# Patient Record
Sex: MALE | Race: WHITE | NOT HISPANIC OR LATINO | Employment: OTHER | URBAN - METROPOLITAN AREA
[De-identification: names, ages, dates, MRNs, and addresses within clinical notes are randomized per-mention and may not be internally consistent; named-entity substitution may affect disease eponyms.]

---

## 2023-12-16 ENCOUNTER — APPOINTMENT (OUTPATIENT)
Dept: RADIOLOGY | Facility: IMAGING CENTER | Age: 78
End: 2023-12-16
Payer: MEDICARE

## 2023-12-16 ENCOUNTER — OFFICE VISIT (OUTPATIENT)
Dept: URGENT CARE | Facility: CLINIC | Age: 78
End: 2023-12-16
Payer: MEDICARE

## 2023-12-16 VITALS
OXYGEN SATURATION: 92 % | HEART RATE: 94 BPM | WEIGHT: 225 LBS | SYSTOLIC BLOOD PRESSURE: 146 MMHG | RESPIRATION RATE: 16 BRPM | HEIGHT: 75 IN | DIASTOLIC BLOOD PRESSURE: 62 MMHG | BODY MASS INDEX: 27.98 KG/M2 | TEMPERATURE: 99.7 F

## 2023-12-16 DIAGNOSIS — R05.8 PRODUCTIVE COUGH: ICD-10-CM

## 2023-12-16 DIAGNOSIS — R03.0 ELEVATED BLOOD PRESSURE READING: ICD-10-CM

## 2023-12-16 DIAGNOSIS — J06.9 UPPER RESPIRATORY TRACT INFECTION, UNSPECIFIED TYPE: ICD-10-CM

## 2023-12-16 PROBLEM — M67.919 DISORDER OF ROTATOR CUFF: Status: ACTIVE | Noted: 2020-09-16

## 2023-12-16 PROBLEM — J34.2 DEVIATED NASAL SEPTUM: Status: ACTIVE | Noted: 2023-07-26

## 2023-12-16 PROBLEM — M25.551 PAIN IN RIGHT HIP: Status: ACTIVE | Noted: 2023-12-16

## 2023-12-16 PROBLEM — M54.50 ACUTE LOW BACK PAIN: Status: ACTIVE | Noted: 2020-09-16

## 2023-12-16 PROBLEM — S47.9XXA: Status: ACTIVE | Noted: 2023-03-08

## 2023-12-16 PROBLEM — M89.49 OSTEOARTHROSIS INVOLVING MULTIPLE SITES BUT NOT DESIGNATED AS GENERALIZED: Status: ACTIVE | Noted: 2023-12-16

## 2023-12-16 PROBLEM — Z96.642 S/P TOTAL LEFT HIP ARTHROPLASTY: Status: ACTIVE | Noted: 2023-08-16

## 2023-12-16 PROBLEM — D32.9 BENIGN NEOPLASM OF MENINGES (HCC): Status: ACTIVE | Noted: 2023-12-16

## 2023-12-16 PROBLEM — M48.00 SPINAL STENOSIS: Status: ACTIVE | Noted: 2020-09-18

## 2023-12-16 PROBLEM — B54 MALARIA: Status: ACTIVE | Noted: 2023-12-16

## 2023-12-16 PROBLEM — M16.9 OSTEOARTHRITIS OF HIP: Status: ACTIVE | Noted: 2020-09-18

## 2023-12-16 LAB
FLUAV RNA SPEC QL NAA+PROBE: NEGATIVE
FLUBV RNA SPEC QL NAA+PROBE: NEGATIVE
RSV RNA SPEC QL NAA+PROBE: NEGATIVE
SARS-COV-2 RNA RESP QL NAA+PROBE: NEGATIVE

## 2023-12-16 PROCEDURE — 3077F SYST BP >= 140 MM HG: CPT

## 2023-12-16 PROCEDURE — 0241U POCT CEPHEID COV-2, FLU A/B, RSV - PCR: CPT

## 2023-12-16 PROCEDURE — 3078F DIAST BP <80 MM HG: CPT

## 2023-12-16 PROCEDURE — 71046 X-RAY EXAM CHEST 2 VIEWS: CPT | Mod: TC

## 2023-12-16 PROCEDURE — 99203 OFFICE O/P NEW LOW 30 MIN: CPT

## 2023-12-16 RX ORDER — ACETAMINOPHEN 500 MG
1000 TABLET ORAL
COMMUNITY
Start: 2023-07-28

## 2023-12-16 RX ORDER — ASPIRIN 81 MG/1
81 TABLET ORAL
COMMUNITY
Start: 2023-08-16 | End: 2023-12-16

## 2023-12-16 RX ORDER — ASPIRIN 81 MG/1
81 TABLET, CHEWABLE ORAL
COMMUNITY
Start: 2023-07-28

## 2023-12-16 RX ORDER — CELECOXIB 200 MG/1
200 CAPSULE ORAL
COMMUNITY
Start: 2023-07-28

## 2023-12-16 RX ORDER — VITAMIN B COMPLEX
1000 TABLET ORAL DAILY
COMMUNITY

## 2023-12-16 RX ORDER — METHYLPREDNISOLONE 4 MG/1
TABLET ORAL
Qty: 21 TABLET | Refills: 0 | Status: SHIPPED | OUTPATIENT
Start: 2023-12-16

## 2023-12-16 RX ORDER — ROSUVASTATIN CALCIUM 5 MG/1
5 TABLET, COATED ORAL
COMMUNITY
Start: 2023-09-15 | End: 2024-09-14

## 2023-12-16 RX ORDER — AMOXICILLIN AND CLAVULANATE POTASSIUM 875; 125 MG/1; MG/1
1 TABLET, FILM COATED ORAL 2 TIMES DAILY
Qty: 14 TABLET | Refills: 0 | Status: SHIPPED | OUTPATIENT
Start: 2023-12-16 | End: 2023-12-23

## 2023-12-16 RX ORDER — ALBUTEROL SULFATE 90 UG/1
2 AEROSOL, METERED RESPIRATORY (INHALATION) EVERY 6 HOURS PRN
Qty: 8.5 G | Refills: 0 | Status: SHIPPED | OUTPATIENT
Start: 2023-12-16

## 2023-12-16 RX ORDER — POLYETHYLENE GLYCOL 3350, SODIUM SULFATE ANHYDROUS, SODIUM BICARBONATE, SODIUM CHLORIDE, POTASSIUM CHLORIDE 236; 22.74; 6.74; 5.86; 2.97 G/4L; G/4L; G/4L; G/4L; G/4L
POWDER, FOR SOLUTION ORAL
COMMUNITY
Start: 2023-09-15

## 2023-12-16 RX ORDER — RAMIPRIL 10 MG/1
10 CAPSULE ORAL DAILY
COMMUNITY
Start: 2023-09-15 | End: 2024-09-14

## 2023-12-16 RX ORDER — IBUPROFEN 200 MG
200 TABLET ORAL
COMMUNITY
Start: 2023-07-28

## 2023-12-16 NOTE — PROGRESS NOTES
Subjective:   Doe Ramos is a 78 y.o. male who presents for Cough (Runny nose, sneezing, productive cough with dark / bloody phlegm, body aches x  4 days)      HPI:    Patient presents to urgent care with concerns of rhinorrhea, nasal congestion, headache, sore throat, cough.  States he is also sneezing.   He is accompanied by his wife  Onset was 4 days ago  Reports wheezing, chest tightness, SOB  Denies pmh of asthma, copd, smoking  States his cough is productive of dark brown/bloody phlegm  Endorses low grade fever, not measured at home.  Experiencing body aches and chills.  Denies known sick contacts  Mild improvement with Tylenol, Mucinex  Vaccinations are up to date  Tolerating solids and fluids, reports decreased appetite  Normal urinary output  Patient is visiting friends in the [place] area. He resides in [place].      ROS As above in HPI    Medications:    Current Outpatient Medications on File Prior to Visit   Medication Sig Dispense Refill    acetaminophen (TYLENOL) 500 MG Tab Take 1,000 mg by mouth.      rosuvastatin (CRESTOR) 5 MG Tab Take 5 mg by mouth.      ramipril (ALTACE) 10 MG capsule Take 10 mg by mouth every day.      celecoxib (CELEBREX) 200 MG Cap Take 200 mg by mouth.      ibuprofen (MOTRIN) 200 MG Tab Take 200 mg by mouth.      vitamin D3 (CHOLECALCIFEROL) 1000 Unit (25 mcg) Tab Take 1,000 Units by mouth every day.      aspirin (ASA) 81 MG Chew Tab chewable tablet Chew 81 mg. (Patient not taking: Reported on 12/16/2023)      PEG 3350-KCl-NaBcb-NaCl-NaSulf (PEG-3350/ELECTROLYTES) 236 g Recon Soln Drink 1st portion of prep at 6 PM the evening before. 2nd portion must be started 3 hours before and finished 2 hours prior to report time (Patient not taking: Reported on 12/16/2023)       No current facility-administered medications on file prior to visit.        Allergies:   Cefadroxil    Problem List:   Patient Active Problem List   Diagnosis    Acute low back pain    Benign neoplasm of meninges  "(HCC)    CAD (coronary artery disease)    Crushing injury of multiple sites of upper extremity    Deviated nasal septum    Disorder of rotator cuff    Malaria    Hyperlipidemia    Osteoarthritis of hip    Osteoarthrosis involving multiple sites but not designated as generalized    Pain in right hip    Primary hypertension    S/P total left hip arthroplasty    Spinal stenosis        Surgical History:  No past surgical history on file.    Past Social Hx:           Problem list, medications, and allergies reviewed by myself today in Epic.     Objective:     BP (!) 146/62 (BP Location: Left arm, Patient Position: Sitting, BP Cuff Size: Adult)   Pulse 94   Temp 37.6 °C (99.7 °F) (Temporal)   Resp 16   Ht 1.905 m (6' 3\")   Wt 102 kg (225 lb)   SpO2 92%   BMI 28.12 kg/m²     Physical Exam  Vitals and nursing note reviewed.   Constitutional:       General: He is not in acute distress.     Appearance: Normal appearance. He is not ill-appearing or diaphoretic.   HENT:      Head: Normocephalic.      Right Ear: Ear canal normal. Tympanic membrane is injected.      Left Ear: Ear canal normal. Tympanic membrane is injected.      Nose: Congestion and rhinorrhea present. Rhinorrhea is clear.      Right Sinus: Maxillary sinus tenderness present. No frontal sinus tenderness.      Left Sinus: Maxillary sinus tenderness present. No frontal sinus tenderness.      Mouth/Throat:      Mouth: Mucous membranes are moist.      Pharynx: Oropharynx is clear. Uvula midline. Posterior oropharyngeal erythema (Mild PND) present. No pharyngeal swelling, oropharyngeal exudate or uvula swelling.      Tonsils: No tonsillar abscesses. 0 on the right. 0 on the left.   Cardiovascular:      Rate and Rhythm: Normal rate and regular rhythm.      Heart sounds: Normal heart sounds. No murmur heard.     No friction rub. No gallop.   Pulmonary:      Effort: Pulmonary effort is normal. No respiratory distress.      Breath sounds: Normal breath sounds. No " stridor. No wheezing, rhonchi or rales.   Chest:      Chest wall: No tenderness.   Abdominal:      General: Bowel sounds are normal.      Palpations: Abdomen is soft.   Musculoskeletal:      Cervical back: No rigidity or tenderness.   Lymphadenopathy:      Cervical: No cervical adenopathy.   Skin:     General: Skin is warm and dry.      Capillary Refill: Capillary refill takes less than 2 seconds.      Findings: No rash.   Neurological:      Mental Status: He is alert and oriented to person, place, and time.         Assessment/Plan:       Results for orders placed or performed in visit on 12/16/23   POCT CEPHEID COV-2, FLU A/B, RSV - PCR   Result Value Ref Range    SARS-CoV-2 by PCR Negative Negative, Invalid    Influenza virus A RNA Negative Negative, Invalid    Influenza virus B, PCR Negative Negative, Invalid    RSV, PCR Negative Negative, Invalid     DX-CHEST-2 VIEWS 12/16/2023    Narrative  12/16/2023 12:55 PM    HISTORY/REASON FOR EXAM:  Cough and fatigue.    TECHNIQUE/EXAM DESCRIPTION AND NUMBER OF VIEWS:  Two views of the chest.    COMPARISON: None.    FINDINGS:  There is no evidence of focal consolidation or evidence of pulmonary edema.  The heart is normal in size.  There is no evidence of pleural effusion.  Soft tissues and bony structures are unremarkable.    Impression  No evidence of acute cardiopulmonary process.      Diagnosis and associated orders:   1. Elevated blood pressure reading    2. Productive cough  - DX-CHEST-2 VIEWS; Future  - POCT CEPHEID COV-2, FLU A/B, RSV - PCR  - albuterol 108 (90 Base) MCG/ACT Aero Soln inhalation aerosol; Inhale 2 Puffs every 6 hours as needed for Shortness of Breath.  Dispense: 8.5 g; Refill: 0  - methylPREDNISolone (MEDROL DOSEPAK) 4 MG Tablet Therapy Pack; Follow schedule on package instructions.  Dispense: 21 Tablet; Refill: 0  - amoxicillin-clavulanate (AUGMENTIN) 875-125 MG Tab; Take 1 Tablet by mouth 2 times a day for 7 days.  Dispense: 14 Tablet; Refill:  0    3. Upper respiratory tract infection, unspecified type    Other orders  - acetaminophen (TYLENOL) 500 MG Tab; Take 1,000 mg by mouth.  - rosuvastatin (CRESTOR) 5 MG Tab; Take 5 mg by mouth.  - ramipril (ALTACE) 10 MG capsule; Take 10 mg by mouth every day.  - celecoxib (CELEBREX) 200 MG Cap; Take 200 mg by mouth.  - aspirin (ASA) 81 MG Chew Tab chewable tablet; Chew 81 mg. (Patient not taking: Reported on 12/16/2023)  - ibuprofen (MOTRIN) 200 MG Tab; Take 200 mg by mouth.  - PEG 3350-KCl-NaBcb-NaCl-NaSulf (PEG-3350/ELECTROLYTES) 236 g Recon Soln; Drink 1st portion of prep at 6 PM the evening before. 2nd portion must be started 3 hours before and finished 2 hours prior to report time (Patient not taking: Reported on 12/16/2023)  - vitamin D3 (CHOLECALCIFEROL) 1000 Unit (25 mcg) Tab; Take 1,000 Units by mouth every day.        Comments/MDM:     Chest x-ray obtained to evaluate for pneumonia.  Per radiology impression chest x-ray is negative for acute cardiopulmonary processes.  Patient tested negative for COVID, influenza, RSV  Blood pressure is elevated, patient reports he normally has well-controlled pressure.  He states he has been using Mucinex and over-the-counter cough solutions more often. Patient advised to closely monitor his blood pressure and to follow-up with his primary care in [place].  Antibiotics ordered for contingent worsening of symptoms.  He is to closely monitor his SpO2 via pulse ox at home.  Supportive measures encouraged: Rest, increased oral hydration, NSAIDs/tylenol as needed per package instructions, decongestant, warm humidification, otc histamines, Flonase.  Return to ER precautions reviewed  Follow-up with primary care upon return to [place].  Patient and his wife verbalized understanding and consented to plan of care   Please note that this dictation was created using voice recognition software. I have made a reasonable attempt to correct obvious errors, but I expect that there are  errors of grammar and possibly content that I did not discover before finalizing the note.    This note was electronically signed by GADIEL Amor

## 2023-12-20 ENCOUNTER — TELEPHONE (OUTPATIENT)
Dept: URGENT CARE | Facility: CLINIC | Age: 78
End: 2023-12-20
Payer: MEDICARE

## 2023-12-20 NOTE — TELEPHONE ENCOUNTER
Pt came in clinic requesting to speak to you. Pt did not want to release any information in regards to topic. Phone number [phone]. Please advise.

## 2024-09-04 ENCOUNTER — HOSPITAL ENCOUNTER (EMERGENCY)
Facility: MEDICAL CENTER | Age: 79
End: 2024-09-04
Attending: EMERGENCY MEDICINE
Payer: MEDICARE

## 2024-09-04 VITALS
WEIGHT: 238.54 LBS | HEIGHT: 74 IN | SYSTOLIC BLOOD PRESSURE: 152 MMHG | HEART RATE: 80 BPM | RESPIRATION RATE: 18 BRPM | DIASTOLIC BLOOD PRESSURE: 70 MMHG | OXYGEN SATURATION: 98 % | BODY MASS INDEX: 30.61 KG/M2 | TEMPERATURE: 98.6 F

## 2024-09-04 DIAGNOSIS — U07.1 COVID-19: ICD-10-CM

## 2024-09-04 LAB
FLUAV RNA SPEC QL NAA+PROBE: NEGATIVE
FLUBV RNA SPEC QL NAA+PROBE: NEGATIVE
RSV RNA SPEC QL NAA+PROBE: NEGATIVE
SARS-COV-2 RNA RESP QL NAA+PROBE: DETECTED
SPECIMEN SOURCE: ABNORMAL

## 2024-09-04 PROCEDURE — 0241U HCHG SARS-COV-2 COVID-19 NFCT DS RESP RNA 4 TRGT MIC: CPT

## 2024-09-04 PROCEDURE — 99284 EMERGENCY DEPT VISIT MOD MDM: CPT

## 2024-09-04 NOTE — ED TRIAGE NOTES
"BIB spouse for following complaints.     Chief Complaint   Patient presents with    Flu Like Symptoms     Pt staying in hotel with wife. Thinks they caught something from lots of people around them. Pt complaining of cough, runny nose, headache and nausea.      BP (!) 165/73   Pulse 83   Temp 37 °C (98.6 °F) (Temporal)   Resp 18   Ht 1.88 m (6' 2\")   Wt 108 kg (238 lb 8.6 oz)   SpO2 94%   BMI 30.63 kg/m²     "

## 2024-09-04 NOTE — ED PROVIDER NOTES
"ED Provider Note    CHIEF COMPLAINT  Chief Complaint   Patient presents with    Flu Like Symptoms     Pt staying in hotel with wife. Thinks they caught something from lots of people around them. Pt complaining of cough, runny nose, headache and nausea.        EXTERNAL RECORDS REVIEWED  Patient was admitted to the hospital in March of this year for primary osteoarthritis of the right hip he underwent total hip replacement.    HPI/ROS  LIMITATION TO HISTORY   Select: : None  OUTSIDE HISTORIAN(S):  Spouse, at bedside, she is also a patient in this emergency department with the same symptoms.      Doe Ramos is a 79 y.o. male who presents to the emergency department accompanied by his wife.  They ambulated into triage.  He states that he is 12 to 24 hours behind his wife with similar symptoms.  He reports a mild cough, runny nose, sneezing.  No fever.  He denies nausea, vomiting or diarrhea but reports \"an upset stomach.  No chest pain no shortness of breath.  No sore throat.  They are here, visiting friends, with the intention of buying a house here and moving.  He reports being overall quite healthy despite multiple, 14, previous orthopedic surgeries.    PAST MEDICAL HISTORY   has a past medical history of Hypertension.    SURGICAL HISTORY  Multiple prior orthopedic surgeries none recent    FAMILY HISTORY  History reviewed. No pertinent family history.    SOCIAL HISTORY  Social History     Tobacco Use    Smoking status: Former     Types: Cigarettes    Smokeless tobacco: Never   Vaping Use    Vaping status: Never Used   Substance and Sexual Activity    Alcohol use: Yes    Drug use: Never    Sexual activity: Not on file       CURRENT MEDICATIONS  Home Medications       Reviewed by Wilfredo Devlin R.N. (Registered Nurse) on 09/04/24 at 1005  Med List Status: Not Addressed     Medication Last Dose Status   acetaminophen (TYLENOL) 500 MG Tab  Active   albuterol 108 (90 Base) MCG/ACT Aero Soln inhalation aerosol  " "Active   aspirin (ASA) 81 MG Chew Tab chewable tablet  Active   celecoxib (CELEBREX) 200 MG Cap  Active   ibuprofen (MOTRIN) 200 MG Tab  Active   methylPREDNISolone (MEDROL DOSEPAK) 4 MG Tablet Therapy Pack  Active   PEG 3350-KCl-NaBcb-NaCl-NaSulf (PEG-3350/ELECTROLYTES) 236 g Recon Soln  Active   ramipril (ALTACE) 10 MG capsule  Active   rosuvastatin (CRESTOR) 5 MG Tab  Active   vitamin D3 (CHOLECALCIFEROL) 1000 Unit (25 mcg) Tab  Active                    ALLERGIES  Allergies   Allergen Reactions    Cefadroxil Unspecified     Reaction was rectal bleeding       PHYSICAL EXAM  VITAL SIGNS: BP (!) 152/70   Pulse 80   Temp 37 °C (98.6 °F) (Temporal)   Resp 18   Ht 1.88 m (6' 2\")   Wt 108 kg (238 lb 8.6 oz)   SpO2 98%   BMI 30.63 kg/m²    Vitals reviewed.  Constitutional: Patient is oriented to person, place, and time. Appears well-developed and well-nourished. No distress.    Head: Normocephalic and atraumatic.   Ears: Normal external ears bilaterally.   Mouth/Throat: Oropharynx is clear .  Eyes: Conjunctivae are normal. Pupils are equal, round, and reactive to light.   Neck: Normal range of motion.   Cardiovascular: Normal rate, regular rhythm and normal heart sounds.   Pulmonary/Chest: Effort normal and breath sounds normal. No respiratory distress, no wheezes, rhonchi, or rales.  Abdominal: Soft. Bowel sounds are normal. There is no tenderness  Musculoskeletal: No edema and no tenderness.   Lymphadenopathy: No cervical adenopathy.   Neurological: No cranial nerve deficits. Normal gait. No focal deficits.   Skin: Skin is warm and dry. No erythema. No pallor.   Psychiatric: Patient has a normal mood and affect.     EKG/LABS  Results for orders placed or performed during the hospital encounter of 09/04/24   CoV-2, Flu A/B, And RSV by PCR ([facility])    Specimen: Nasopharyngeal; Respirate   Result Value Ref Range    Influenza virus A RNA Negative Negative    Influenza virus B, PCR Negative Negative    RSV, PCR " Negative Negative    SARS-CoV-2 by PCR DETECTED (AA)     SARS-CoV-2 Source Nasal Swab        I have independently interpreted this EKG    RADIOLOGY/PROCEDURES       COURSE & MEDICAL DECISION MAKING    ASSESSMENT, COURSE AND PLAN  Care Narrative:     This is a pleasant and overall well-appearing 79-year-old male.  Visiting here from out of town, staying with friends.  They have been recently at a hotel on their way here.  His wife is also a patient in this emergency department with similar symptoms.  Suspect viral illness.  Lungs are clear.  There is no increased work of breathing.  He satting well on room air.  Doubt need for chest x-ray.  I have ordered quadrivalent swab.  He would be within the window for treatment with Paxlovid should he be positive for COVID additionally, he would be within the window for treatment with Tamiflu should he be positive for influenza.    12:10 PM D/W Pharmacy re: Current medications and any contraindications to Paxlovid.  There did not appear to be any.  Patient does not report any prior kidney problems.he tested positive for COVID.  He is within the 5-day window.  He is a candidate for Paxlovid and he would like to take it.  He is given full strength given no history of kidney problems.  He is overall well-appearing.  He is not hypoxic, tachycardic or febrile.  There is no increased work of breathing.  Lungs are clear on exam.  He is given anticipatory guidance.    ADDITIONAL PROBLEMS MANAGED    DISPOSITION AND DISCUSSIONS  I have discussed management of the patient with the following physicians and PONCHO's:  None    Discussion of management with other QHP or appropriate source(s): None     Escalation of care considered, and ultimately not performed: None    Barriers to care at this time, including but not limited to: No local PCP.     Decision tools and prescription drugs considered including, but not limited to: Not indicated in this viral illness \ .    FINAL DIAGNOSIS  1.  COVID-19         Electronically signed by: Talya Saravia D.O., 9/4/2024 11:00 AM